# Patient Record
Sex: MALE | Race: WHITE | NOT HISPANIC OR LATINO | URBAN - METROPOLITAN AREA
[De-identification: names, ages, dates, MRNs, and addresses within clinical notes are randomized per-mention and may not be internally consistent; named-entity substitution may affect disease eponyms.]

---

## 2017-02-17 ENCOUNTER — OFFICE VISIT - RIVER FALLS (OUTPATIENT)
Dept: FAMILY MEDICINE | Facility: CLINIC | Age: 32
End: 2017-02-17

## 2017-02-17 ASSESSMENT — MIFFLIN-ST. JEOR: SCORE: 1714.17

## 2021-05-20 ENCOUNTER — AMBULATORY - RIVER FALLS (OUTPATIENT)
Dept: FAMILY MEDICINE | Facility: CLINIC | Age: 36
End: 2021-05-20

## 2021-05-20 ENCOUNTER — OFFICE VISIT - RIVER FALLS (OUTPATIENT)
Dept: FAMILY MEDICINE | Facility: CLINIC | Age: 36
End: 2021-05-20

## 2021-05-24 ENCOUNTER — OFFICE VISIT - RIVER FALLS (OUTPATIENT)
Dept: FAMILY MEDICINE | Facility: CLINIC | Age: 36
End: 2021-05-24

## 2021-06-01 ENCOUNTER — OFFICE VISIT - RIVER FALLS (OUTPATIENT)
Dept: FAMILY MEDICINE | Facility: CLINIC | Age: 36
End: 2021-06-01

## 2021-07-01 ENCOUNTER — OFFICE VISIT - RIVER FALLS (OUTPATIENT)
Dept: FAMILY MEDICINE | Facility: CLINIC | Age: 36
End: 2021-07-01

## 2021-07-01 ASSESSMENT — MIFFLIN-ST. JEOR: SCORE: 1789.01

## 2022-02-11 VITALS
BODY MASS INDEX: 28.81 KG/M2 | DIASTOLIC BLOOD PRESSURE: 70 MMHG | DIASTOLIC BLOOD PRESSURE: 74 MMHG | WEIGHT: 195.1 LBS | DIASTOLIC BLOOD PRESSURE: 78 MMHG | DIASTOLIC BLOOD PRESSURE: 80 MMHG | TEMPERATURE: 98.4 F | HEART RATE: 91 BPM | BODY MASS INDEX: 28.35 KG/M2 | OXYGEN SATURATION: 98 % | TEMPERATURE: 98 F | BODY MASS INDEX: 26.92 KG/M2 | SYSTOLIC BLOOD PRESSURE: 114 MMHG | HEART RATE: 72 BPM | HEART RATE: 68 BPM | WEIGHT: 192 LBS | OXYGEN SATURATION: 98 % | TEMPERATURE: 97.5 F | SYSTOLIC BLOOD PRESSURE: 111 MMHG | HEIGHT: 70 IN | TEMPERATURE: 97.2 F | WEIGHT: 188 LBS | SYSTOLIC BLOOD PRESSURE: 110 MMHG | HEART RATE: 66 BPM | SYSTOLIC BLOOD PRESSURE: 108 MMHG

## 2022-02-11 VITALS
SYSTOLIC BLOOD PRESSURE: 102 MMHG | DIASTOLIC BLOOD PRESSURE: 76 MMHG | BODY MASS INDEX: 25.92 KG/M2 | RESPIRATION RATE: 16 BRPM | HEART RATE: 60 BPM | WEIGHT: 175 LBS | HEIGHT: 69 IN | TEMPERATURE: 98.1 F

## 2022-02-16 NOTE — NURSING NOTE
Comprehensive Intake Entered On:  5/24/2021 5:45 PM CDT    Performed On:  5/24/2021 5:42 PM CDT by Cassie Chun LPN               Summary   Chief Complaint :   here for f/u on work comp injury, sutures in left hand, has numbness along one side of the thumb   Weight Measured :   195.1 lb(Converted to: 195 lb 2 oz, 88.496 kg)    Systolic Blood Pressure :   114 mmHg   Diastolic Blood Pressure :   70 mmHg   Mean Arterial Pressure :   85 mmHg   Peripheral Pulse Rate :   91 bpm   BP Site :   Right arm   BP Method :   Manual   Temperature Tympanic :   97.5 DegF(Converted to: 36.4 DegC)  (LOW)    Oxygen Saturation :   98 %   Cassie Chun LPN - 5/24/2021 5:42 PM CDT   Health Status   Allergies Verified? :   Yes   Medication History Verified? :   Yes   Medical History Verified? :   No   Pre-Visit Planning Status :   Completed   Tobacco Use? :   Never smoker   Cassie Chun LPN - 5/24/2021 5:42 PM CDT   Meds / Allergies   (As Of: 5/24/2021 5:45:46 PM CDT)   Allergies (Active)   No Known Medication Allergies  Estimated Onset Date:   Unspecified ; Created By:   Cassie Chun LPN; Reaction Status:   Active ; Category:   Drug ; Substance:   No Known Medication Allergies ; Type:   Allergy ; Updated By:   Cassie Chun LPN; Reviewed Date:   5/20/2021 8:05 AM CDT        Medication List   (As Of: 5/24/2021 5:45:46 PM CDT)   Prescription/Discharge Order    amoxicillin-clavulanate  :   amoxicillin-clavulanate ; Status:   Prescribed ; Ordered As Mnemonic:   amoxicillin-clavulanate 875 mg-125 mg oral tablet ; Simple Display Line:   1 tab(s), Oral, q12 hrs, for 10 day(s), 20 tab(s), 0 Refill(s) ; Ordering Provider:   Lauren Gonzalez; Catalog Code:   amoxicillin-clavulanate ; Order Dt/Tm:   5/20/2021 10:00:30 AM CDT            ID Risk Screen   Recent Travel History :   No recent travel   Family Member Travel History :   No recent travel   Other Exposure to Infectious Disease :   Unknown   COVID-19 Testing  Status :   No positive COVID-19 test   Cassie Chun LPN - 5/24/2021 5:42 PM CDT

## 2022-02-16 NOTE — PROGRESS NOTES
Patient:   DEBI RICHARDSON            MRN: [id number]            FIN: [id number]               Age:   35 years     Sex:  Male     :  1985   Associated Diagnoses:   Visit for wound check   Author:   Lauren Gonzalez      Visit Information      Date of Service: 2021 05:36 pm  Performing Location: [facility]  Encounter#: [id number]      Chief Complaint   2021 5:42 PM CDT    here for f/u on work comp injury, sutures in left hand, has numbness along one side of the thumb        History of Present Illness   here for wound check  sutures left hand on , feels it is healing well  has some reduced sensation on the inner aspect of the thumb  is following work modifications      Physical Examination   Vital Signs   2021 5:42 PM CDT Temperature Tympanic 97.5 DegF  LOW    Peripheral Pulse Rate 91 bpm    Systolic Blood Pressure 114 mmHg    Diastolic Blood Pressure 70 mmHg    Mean Arterial Pressure 85 mmHg    BP Site Right arm    BP Method Manual    Oxygen Saturation 98 %      General:  3 sutures in place left thumb  monofilament test reveals sensation but reduced sensation on the medial aspect of the thumb.    good motion/mobility  no erythema.       Impression and Plan   Diagnosis     Visit for wound check (HHC27-YN Z51.89).     Plan:  continue same work restrictions  suture removal in 7 days.

## 2022-02-16 NOTE — NURSING NOTE
Comprehensive Intake Entered On:  5/20/2021 8:08 AM CDT    Performed On:  5/20/2021 8:04 AM CDT by Cassie Chun LPN               Summary   Chief Complaint :   work comp: poked himself with a razor blade this morning at work   Systolic Blood Pressure :   108 mmHg   Diastolic Blood Pressure :   78 mmHg   Mean Arterial Pressure :   88 mmHg   Peripheral Pulse Rate :   68 bpm   BP Site :   Right arm   BP Method :   Manual   Temperature Tympanic :   97.2 DegF(Converted to: 36.2 DegC)  (LOW)    Oxygen Saturation :   98 %   Cassie Chun LPN - 5/20/2021 8:04 AM CDT   Health Status   Allergies Verified? :   Yes   Medication History Verified? :   Yes   Medical History Verified? :   No   Pre-Visit Planning Status :   Completed   Tobacco Use? :   Never smoker   Cassie Chun LPN - 5/20/2021 8:04 AM CDT   Meds / Allergies   (As Of: 5/20/2021 8:08:11 AM CDT)   Allergies (Active)   No Known Medication Allergies  Estimated Onset Date:   Unspecified ; Created By:   Cassie Chun LPN; Reaction Status:   Active ; Category:   Drug ; Substance:   No Known Medication Allergies ; Type:   Allergy ; Updated By:   Cassie Chun LPN; Reviewed Date:   5/20/2021 8:05 AM CDT        Medication List   (As Of: 5/20/2021 8:08:11 AM CDT)        ID Risk Screen   Recent Travel History :   No recent travel   Family Member Travel History :   No recent travel   Other Exposure to Infectious Disease :   Unknown   COVID-19 Testing Status :   No positive COVID-19 test   Cassie Chun LPN - 5/20/2021 8:04 AM CDT   Social History   Social History   (As Of: 5/20/2021 8:08:11 AM CDT)   Alcohol:  Current      Current, 1-2 times per week, 6 drinks/episode average.   (Last Updated: 2/17/2017 5:05:42 PM CST by Arthur Gomez CMA)          Tobacco:        Never (less than 100 in lifetime)   (Last Updated: 5/20/2021 8:05:57 AM CDT by Cassie Chun LPN)          Electronic Cigarette/Vaping:        Electronic Cigarette Use: Never.    (Last Updated: 5/20/2021 8:06:02 AM CDT by Cassie Chun LPN)

## 2022-02-16 NOTE — NURSING NOTE
Comprehensive Intake Entered On:  2021 4:55 PM CDT    Performed On:  2021 4:50 PM CDT by Helene Martines CMA               Summary   Chief Complaint :   Rash on skin spreading   Weight Measured :   188 lb(Converted to: 188 lb 0 oz, 85.275 kg)    Height Measured :   70 in(Converted to: 5 ft 10 in, 177.80 cm)    Body Mass Index :   26.97 kg/m2 (HI)    Body Surface Area :   2.05 m2   Height/Length Estimated :   69 in(Converted to: 5 ft 9 in, 175.26 cm)    Systolic Blood Pressure :   110 mmHg   Diastolic Blood Pressure :   80 mmHg   Mean Arterial Pressure :   90 mmHg   Peripheral Pulse Rate :   72 bpm   BP Site :   Right arm   Pulse Site :   Radial artery   BP Method :   Electronic   HR Method :   Electronic   Temperature Tympanic :   98.4 DegF(Converted to: 36.9 DegC)    Helene Martines CMA - 2021 4:50 PM CDT   Health Status   Allergies Verified? :   Yes   Medication History Verified? :   Yes   Medical History Verified? :   Yes   Pre-Visit Planning Status :   Completed   Helene Martines CMA - 2021 4:50 PM CDT   Demographics   Last Name :   DEBRA   Address :   [address] [place] [address]   First Name :   DEBI Parr Initial :   J   Responsible Party Date of Birth () :   1985 CDT   City :   [place]   State :   [place]   Zip Code :   [address]   Contact Relationship to Patient (other) :   Patient   Helene Martines CMA - 2021 4:50 PM CDT   Consents   Consent for Immunization Exchange :   Consent Granted   Consent for Immunizations to Providers :   Consent Granted   Helene Martines CMA - 2021 4:50 PM CDT   Meds / Allergies   (As Of: 2021 4:55:23 PM CDT)   Allergies (Active)   No Known Medication Allergies  Estimated Onset Date:   Unspecified ; Created By:   Cassie Chun LPN; Reaction Status:   Active ; Category:   Drug ; Substance:   No Known Medication Allergies ; Type:   Allergy ; Updated By:   Cassie Chun LPN; Reviewed Date:   2021 4:53 PM CDT        Medication List   (As Of:  7/1/2021 4:55:23 PM CDT)        ID Risk Screen   Recent Travel History :   No recent travel   Family Member Travel History :   No recent travel   Other Exposure to Infectious Disease :   Unknown   COVID-19 Testing Status :   No positive COVID-19 test   Helene Martines CMA - 7/1/2021 4:50 PM CDT   Social History   Social History   (As Of: 7/1/2021 4:55:23 PM CDT)   Alcohol:  Current      Current, 1-2 times per week, 6 drinks/episode average.   (Last Updated: 2/17/2017 5:05:42 PM CST by Arthur Gomez CMA)          Tobacco:        Never (less than 100 in lifetime)   (Last Updated: 5/20/2021 8:05:57 AM CDT by Cassie Chun LPN)          Electronic Cigarette/Vaping:        Electronic Cigarette Use: Never.   (Last Updated: 5/20/2021 8:06:02 AM CDT by Cassie Chun LPN)

## 2022-02-16 NOTE — NURSING NOTE
Comprehensive Intake Entered On:  6/1/2021 5:00 PM CDT    Performed On:  6/1/2021 4:56 PM CDT by Paula Rod CMA               Summary   Chief Complaint :   Suture removal on left hand for work comp follow up   Weight Measured :   192 lb(Converted to: 192 lb 0 oz, 87.090 kg)    Height/Length Estimated :   69 in(Converted to: 5 ft 9 in, 175.26 cm)    Systolic Blood Pressure :   111 mmHg   Diastolic Blood Pressure :   74 mmHg   Mean Arterial Pressure :   86 mmHg   Peripheral Pulse Rate :   66 bpm   BP Site :   Right arm   BP Method :   Electronic   Temperature Tympanic :   98.0 DegF(Converted to: 36.7 DegC)    Paula Rod CMA - 6/1/2021 4:56 PM CDT   Health Status   Allergies Verified? :   Yes   Medication History Verified? :   Yes   Medical History Verified? :   Yes   Pre-Visit Planning Status :   Completed   Tobacco Use? :   Never smoker   Paula Rod CMA - 6/1/2021 4:56 PM CDT   Consents   Consent for Immunization Exchange :   Consent Granted   Consent for Immunizations to Providers :   Consent Granted   Paula Rod CMA - 6/1/2021 4:56 PM CDT   Meds / Allergies   (As Of: 6/1/2021 5:00:18 PM CDT)   Allergies (Active)   No Known Medication Allergies  Estimated Onset Date:   Unspecified ; Created By:   Cassie Chun LPN; Reaction Status:   Active ; Category:   Drug ; Substance:   No Known Medication Allergies ; Type:   Allergy ; Updated By:   Cassie Chun LPN; Reviewed Date:   6/1/2021 4:58 PM CDT        Medication List   (As Of: 6/1/2021 5:00:18 PM CDT)   No Known Home Medications     Paula Rod CMA - 6/1/2021 4:58:32 PM           ID Risk Screen   Recent Travel History :   No recent travel   Family Member Travel History :   No recent travel   Other Exposure to Infectious Disease :   Unknown   COVID-19 Testing Status :   No COVID-19 test performed   Paula Rod CMA - 6/1/2021 4:56 PM CDT

## 2022-02-16 NOTE — NURSING NOTE
Faxed Provider Injury Alert paperwork for work comp to [employer] at [phone]. Confirmation received.  DOI: 05/20/2021  Policy# [id number]    Employer: [employer]

## 2022-02-16 NOTE — PROGRESS NOTES
Patient:   DEBI RICHARDSON            MRN: [id number]            FIN: [id number]               Age:   35 years     Sex:  Male     :  1985   Associated Diagnoses:   Eczema   Author:   Arnoldo Alonso MD      Chief Complaint   2021 4:50 PM CDT     Rash on skin spreading     Chief complaint and symptoms noted above confirmed with patient.      History of Present Illness             The patient presents for a follow-up evaluation of eczema.  The quality of the eczema since the patient's last visit is described as increased, itchy, painful, burning, patches.  The eczema is located bilaterally on the, hand leg.  Exacerbating factors consist of season.  Relieving factors consist of none.        Review of Systems   Constitutional:  Negative.    Integumentary:  Negative except as documented in history of present illness.       Health Status   Allergies:    Allergic Reactions (Selected)  No Known Medication Allergies   Medications:  (Selected)   Prescriptions  Prescribed  betamethasone dipropionate, augmented 0.05% topical cream: See Instructions, Instructions: APPLY TO AFFECTED AREA TWICE DAILY FOR UP TO 3 WEEKS, # 50 gm, 1 Refill(s), Type: Soft Stop, Pharmacy: [facility] #[id number], APPLY TO AFFECTED AREA TWICE DAILY FOR UP TO 3 WEEKS, 70, in, 21 16:50:00 CDT, He...   Problem list:    All Problems (Selected)  HERPES SIMPLEX WITHOUT MENTION OF COMPLICATION / ICD-9-.9 / Confirmed  ORTHOSTATIC HYPOTENSION / ICD-9-.0 / Confirmed  History of urethral stricture / ICD-9-CM V13.09 / Confirmed      Histories   Past Medical History:    Active  History of urethral stricture (ICD-9-CM V13.09): Onset in  at 5 years.  Resolved  History of chicken pox (SNOMED CT [id number]--[id number]):  Resolved.   Family History:    No family history items have been selected or recorded.   Procedure history:    Meatotomy of urethra (SNOMED CT 07595448) on 1991 at 5 Years.  Cystoscopy (SNOMED  CT 31015029) on 4/17/1991 at 5 Years.      Physical Examination   Vital Signs   7/1/2021 4:50 PM CDT Temperature Tympanic 98.4 DegF    Peripheral Pulse Rate 72 bpm    Pulse Site Radial artery    HR Method Electronic    Systolic Blood Pressure 110 mmHg    Diastolic Blood Pressure 80 mmHg    Mean Arterial Pressure 90 mmHg    BP Site Right arm    BP Method Electronic      Measurements from flowsheet : Measurements   7/1/2021 4:50 PM CDT Height Measured - Standard 70 in    Height/Length Estimated 69 in    Weight Measured - Standard 188 lb    BSA 2.05 m2    Body Mass Index 26.97 kg/m2  HI      General:  Alert and oriented, No acute distress.    Neck:  Supple.    Respiratory:  Lungs are clear to auscultation, Respirations are non-labored.    Cardiovascular:  Normal rate, Regular rhythm.    Integumentary:  Rash c/w Eczema to calfs and hands.       Impression and Plan   Diagnosis     Eczema (SGL34-DY L30.8).     Course:  Progressing as expected.    Orders     Orders (Selected)   Prescriptions  Prescribed  betamethasone dipropionate, augmented 0.05% topical cream: See Instructions, Instructions: APPLY TO AFFECTED AREA TWICE DAILY FOR UP TO 3 WEEKS, # 50 gm, 1 Refill(s), Type: Soft Stop, [id number], APPLY TO AFFECTED AREA TWICE DAILY FOR UP TO 3 WEEKS, 70, in, 07/01/21 16:50:00 CDT, [place].

## 2022-02-16 NOTE — PROGRESS NOTES
Patient:   DEBI RICHARDSON            MRN: [id number]            FIN: [id number]               Age:   31 years     Sex:  Male     :  1985   Associated Diagnoses:   Cold sore; Left knee pain   Author:   Santos Anglin PA-C      Chief Complaint   2017 3:55 PM CST    Pt here for left knee pain that happened while he was playing softball last summer. Pt also requesting refill on his acyclovir      History of Present Illness   Chief complaint and symptoms noted above and confirmed with patient   he injured his left knee last summer while stepping on first base wrong, since that time has had some pain in that knee, especially if he bumps it on  something, no swelling, no instability, no locking or catching      Health Status   Allergies:    Allergic Reactions (Selected)  No known allergies   Medications:  (Selected)   Prescriptions  Prescribed  acyclovir 400 mg oral tablet: 1 tab(s) ( 400 mg ), PO, 5x/day, Instructions: use at onset of cold sore, # 25 tab(s), 4 Refill(s), Type: Soft Stop, Pharmacy: [facility] #[id number], 1 tab(s) po 5x/day,x5 day(s),Instr:use at onset of cold sore  betamethasone dipropionate, augmented 0.05% topical cream: See Instructions, Instructions: APPLY TO AFFECTED AREA TWICE DAILY FOR UP TO 3 WEEKS, # 50 gm, Type: Soft Stop, Pharmacy: [facility] #[id number], APPLY TO AFFECTED AREA TWICE DAILY FOR UP TO 3 WEEKS   Problem list:    All Problems (Selected)  HERPES SIMPLEX WITHOUT MENTION OF COMPLICATION / ICD-9-.9 / Confirmed  ORTHOSTATIC HYPOTENSION / ICD-9-.0 / Confirmed  History of urethral stricture / ICD-9-CM V13.09 / Confirmed      Histories   Past Medical History:    Active  History of urethral stricture (V13.09): Onset in  at 5 years.  Resolved  History of chicken pox ([id number]--[id number]):  Resolved.   Family History:    No family history items have been selected or recorded.   Procedure history:    Meatotomy of urethra (65808774) on 1991 at 5  Years.  Cystoscopy (76863662) on 4/17/1991 at 5 Years.      Physical Examination   Vital Signs   2/17/2017 3:55 PM CST Temperature Tympanic 98.1 DegF    Peripheral Pulse Rate 60 bpm    Pulse Site Radial artery    Respiratory Rate 16 br/min    Systolic Blood Pressure 102 mmHg    Diastolic Blood Pressure 76 mmHg    Mean Arterial Pressure 85 mmHg      Measurements from flowsheet : Measurements   2/17/2017 3:55 PM CST Height Measured - Standard 69 in    Weight Measured - Standard 175 lb    BSA 1.96 m2    Body Mass Index 25.84 kg/m2      General:  No acute distress.    Musculoskeletal:  No swelling or deformation of left knee.  No joint line tenderness bilaterally.  Anterior and posterior drawer tests are negative.  No varus or valgal laxity.  No pain with lateral or medial deviation of foot.  No crepitus or pain with Kranthi's test . .       Review / Management   Radiology results   Results reviewed with patient.  Xray shows no fractures or dislocations.  Will be over-read by radiologist.  Will call if over-read has additional information.      Impression and Plan   Diagnosis     Cold sore (AKY82-KB B00.1).     Left knee pain (ZCC08-PB M25.562).     Summary:  will get MRI of left knee, followed by ortho referral.    Orders     Orders   Pharmacy:  acyclovir 400 mg oral tablet (Prescribe): 1 tab(s) ( 400 mg ), PO, 5x/day, x 5 day(s), Instructions: use at onset of cold sore, # 25 tab(s), 4 Refill(s), Type: Soft Stop, Pharmacy: [facility] #[id number], 1 tab(s) po 5x/day,x5 day(s),Instr:use at onset of cold sore  acyclovir 400 mg oral tablet (Modify): 1 tab(s) ( 400 mg ), PO, 5x/day, x 5 day(s), Instructions: use at onset of cold sore, # 25 tab(s), 4 Refill(s), Type: Hard Stop, Pharmacy: [facility] #[id number].     Orders   Requests (Radiology):  XR Knee Min 3 Views Left (Request) (Order): Left knee pain.     Orders   Requests (Consults / Referrals):  Referral (Request) (Order): 2/17/2017 4:43 PM CST, Referred to:  Orthopaedics, Reason for referral: ongoing left knee pain, get MRI first, Left knee pain.     Orders   Requests (Radiology):  MRI Knee Left (Request) (Order): Instructions: W/O CONTRAST  preferably at [facility], Left knee pain.     Orders   Charges (Evaluation and Management):  [id number] office outpatient visit 15 minutes (Charge) (Order): Quantity: 1, Left knee pain  Cold sore.

## 2022-02-16 NOTE — PROGRESS NOTES
Chief Complaint    Suture removal on left hand for work comp follow up  History of Present Illness       Patient is here for suture removal.  He had 3 sutures placed in the first webspace of his right right hand about 10 days ago.  He continues have decreased sensation along the medial aspect of the thumb.  No drainage from the area.  He has been working without significant restrictions.  Review of Systems       See HPI.  All other review of systems negative.  Physical Exam   Vitals & Measurements    T: 98.0  F (Tympanic)  HR: 66 (Peripheral)  BP: 111/74     HT: 69 in  WT: 192 lb        Alert, oriented, no acute distress       Incision is clean and dry, 3 nylon sutures are present       Decreased sensation along the medial aspect of the right thumb  Assessment/Plan       1. Visit for suture removal (Z48.02)          There is cleansed alcohol and 3 sutures easily removed, wound intact  Patient Information     Name:DEBI RICHARDSON      Address:      [address] [place] [id number]     Sex:Male     YOB: 1985     Phone:[phone]     MRN:[id number]     FIN:[id number]     Location:[facility]     Date of Service:06/01/2021      Primary Care Physician:       Derrek MARIE, Santos ADAIR, [phone]      Attending Physician:       Sebas Pinto MD, [phone]  Medications   No active medications  Allergies    No Known Medication Allergies

## 2022-02-16 NOTE — PROGRESS NOTES
Patient:   DEBI RICHARDSON            MRN: [id number]            FIN: [id number]               Age:   35 years     Sex:  Male     :  1985   Associated Diagnoses:   Laceration of hand, left   Author:   Lauren Gonzalez      Physical Examination   General:  Vital Signs   2021 8:04 AM CDT Temperature Tympanic 97.2 DegF  LOW    Peripheral Pulse Rate 68 bpm    Systolic Blood Pressure 108 mmHg    Diastolic Blood Pressure 78 mmHg    Mean Arterial Pressure 88 mmHg    BP Site Right arm    BP Method Manual    Oxygen Saturation 98 %      .       Procedure   Laceration repair procedure   Date/ Time:  2021 10:02:00 AM.     here for work comp injury  just 15 min PTA he used a   off the shelf and accidently poked himself on the palmar aspect of the left hand  1 cm laceration between the thumb and the first finger in the crease.     Confirmed: patient, procedure, side, site, safety procedures followed.     Performed by: self.     Informed consent: signed by patient.     Location: he has normal ad and abduction of the thumb and first finger as well as extension and flexion  after bleeding was controlled the wound was soaked in aneseptic  for 5 min then irrigated with warm dilute , no foreign body observed during exploration.  The wound edges were sharp and the wound was anesthetized with 0.3 cc lidocaine 1% with epi.     Preparation and technique: skin closure completed (with sutures, 3 interrupted 4-0 ethilon), bacitracinantibiotic ointment applied.        Impression and Plan   Diagnosis     Laceration of hand, left (ERG67-XF S61.412A).     Orders     Orders (Selected)   Outpatient Orders  Completed  Adacel (Tdap): 0.5 mL, im, once  Prescriptions  Prescribed  amoxicillin-clavulanate 875 mg-125 mg oral tablet: = 1 tab(s), Oral, q12 hrs, x 10 day(s), # 20 tab(s), 0 Refill(s), Type: Acute, [id number], 1 tab(s) Oral q12 hrs,x10 day(s).     given depth and tool used  for injury , will put on augmentin   wound was dressed and 4x4 and kerlix. He is to leave in place 48 hours then wound check in 4 days, sooner if problems  modified work restrictions needed.